# Patient Record
Sex: MALE | Race: OTHER | NOT HISPANIC OR LATINO | URBAN - METROPOLITAN AREA
[De-identification: names, ages, dates, MRNs, and addresses within clinical notes are randomized per-mention and may not be internally consistent; named-entity substitution may affect disease eponyms.]

---

## 2020-11-09 ENCOUNTER — EMERGENCY (EMERGENCY)
Facility: HOSPITAL | Age: 60
LOS: 1 days | Discharge: HOME | End: 2020-11-09
Attending: EMERGENCY MEDICINE | Admitting: EMERGENCY MEDICINE
Payer: SELF-PAY

## 2020-11-09 VITALS
OXYGEN SATURATION: 99 % | TEMPERATURE: 98 F | RESPIRATION RATE: 16 BRPM | WEIGHT: 160.06 LBS | HEIGHT: 68 IN | SYSTOLIC BLOOD PRESSURE: 138 MMHG | HEART RATE: 89 BPM | DIASTOLIC BLOOD PRESSURE: 91 MMHG

## 2020-11-09 DIAGNOSIS — F10.120 ALCOHOL ABUSE WITH INTOXICATION, UNCOMPLICATED: ICD-10-CM

## 2020-11-09 DIAGNOSIS — R41.82 ALTERED MENTAL STATUS, UNSPECIFIED: ICD-10-CM

## 2020-11-09 PROCEDURE — 70450 CT HEAD/BRAIN W/O DYE: CPT | Mod: 26

## 2020-11-09 PROCEDURE — 99285 EMERGENCY DEPT VISIT HI MDM: CPT

## 2020-11-09 NOTE — ED ADULT NURSE REASSESSMENT NOTE - NS ED NURSE REASSESS COMMENT FT1
Patient remains in rm 8 in no distress, awaiting ct results. sleeping comfortably in stretcher, rousable to tactile stimuli.

## 2020-11-09 NOTE — ED PROVIDER NOTE - PROGRESS NOTE DETAILS
Received pt in s/o from Dr. Gooden pending clinical sobriety.  Pt awake, alert, oriented x 3, ambulating with a steady gait unassisted, stable for dc.

## 2020-11-09 NOTE — ED PROVIDER NOTE - PATIENT PORTAL LINK FT
You can access the FollowMyHealth Patient Portal offered by [facility] by registering at the following website: [email or web address]. By joining [facility]’s FollowMyHealth portal, you will also be able to view your health information using other applications (apps) compatible with our system.

## 2020-11-09 NOTE — ED ADULT NURSE NOTE - NSINTERVENTIONOPT_GEN_ALL_ED
Stretcher Alarms/Move Toilet (commode/urinal) to patient using "arms reach"/Enhanced Supervision/Chair Alarms/Hourly Rounding

## 2020-11-09 NOTE — ED PROVIDER NOTE - OBJECTIVE STATEMENT
Pt is an unknown male, ~60 years old.  BIB EMS for AMS.  Pt found on street sleeping.  Arouses to tactile stimuli but unable to give hx.  No e/o overt trauma but hx is unreliable.

## 2020-11-09 NOTE — ED PROVIDER NOTE - PHYSICAL EXAMINATION
General: lethargic, arousable to touch  Head: NCAT  Eyes: PERRL  ENT: Airway clear  Heart: RRR  Lungs: CTAB  Abd: soft, NTND  Neuro: moves all 4 extremities equally  Skin: no e/o lacerations, abrasions, or ecchymoses

## 2020-11-09 NOTE — ED ADULT TRIAGE NOTE - CHIEF COMPLAINT QUOTE
[place]IBA from [address] called by ANGELES sleeping near staircase. As per ems noted to have empty gin bottle adjacent to the patient. +smell etoh no s/s injury/trauma. unsteady gait on scene.

## 2020-11-09 NOTE — ED PROVIDER NOTE - CARE PLAN
Principal Discharge DX:	Altered mental status   Principal Discharge DX:	Altered mental status  Secondary Diagnosis:	Alcoholic intoxication without complication

## 2020-11-10 VITALS
OXYGEN SATURATION: 98 % | RESPIRATION RATE: 18 BRPM | SYSTOLIC BLOOD PRESSURE: 128 MMHG | HEART RATE: 80 BPM | TEMPERATURE: 98 F | DIASTOLIC BLOOD PRESSURE: 80 MMHG

## 2020-11-10 LAB — ETHANOL SERPL-MCNC: 147 MG/DL — HIGH
